# Patient Record
Sex: FEMALE | Race: BLACK OR AFRICAN AMERICAN | NOT HISPANIC OR LATINO | URBAN - METROPOLITAN AREA
[De-identification: names, ages, dates, MRNs, and addresses within clinical notes are randomized per-mention and may not be internally consistent; named-entity substitution may affect disease eponyms.]

---

## 2018-11-13 ENCOUNTER — OFFICE (OUTPATIENT)
Dept: URBAN - METROPOLITAN AREA CLINIC 12 | Facility: CLINIC | Age: 48
End: 2018-11-13

## 2018-11-13 VITALS
WEIGHT: 175 LBS | HEART RATE: 95 BPM | DIASTOLIC BLOOD PRESSURE: 75 MMHG | SYSTOLIC BLOOD PRESSURE: 111 MMHG | HEIGHT: 68 IN

## 2018-11-13 DIAGNOSIS — E66.3 OVERWEIGHT: ICD-10-CM

## 2018-11-13 DIAGNOSIS — K21.9 GASTRO-ESOPHAGEAL REFLUX DISEASE WITHOUT ESOPHAGITIS: ICD-10-CM

## 2018-11-13 DIAGNOSIS — Z80.0 FAMILY HISTORY OF MALIGNANT NEOPLASM OF DIGESTIVE ORGANS: ICD-10-CM

## 2018-11-13 DIAGNOSIS — Z86.010 PERSONAL HISTORY OF COLONIC POLYPS: ICD-10-CM

## 2018-11-13 PROCEDURE — 99203 OFFICE O/P NEW LOW 30 MIN: CPT | Performed by: INTERNAL MEDICINE

## 2018-11-13 RX ORDER — SODIUM PICOSULFATE, MAGNESIUM OXIDE, AND ANHYDROUS CITRIC ACID 10; 3.5; 12 MG/160ML; G/160ML; G/160ML
LIQUID ORAL
Qty: 1 | Refills: 0 | Status: ACTIVE
Start: 2018-11-13

## 2018-11-13 NOTE — SERVICENOTES
She appears to be overdue for colon polyp surveillance and is high risk for colon cancer screening so we will schedule colonoscopy accordingly as above.

## 2018-11-13 NOTE — SERVICEHPINOTES
Ms. Miner is a 47-year-old female here for evaluation for colonoscopy. The patient states that she does have a family history of colon cancer mother when she was in her 50s. She also states that she had a colonoscopy around [address] [facility] and was told that she had some small polyps and needed to return in one year for another colonoscopy but never returned. She states that she had EGD at the same time which was overall negative. At that time she was having some upper abdominal pain and ultimately was found to have gallstones and so underwent cholecystectomy in her symptoms resolved. She states overall she feels well. She does have some occasional mild reflux which is controlled with her diet though she does state certain foods will bring it on. She will take over-the-counter antacids as needed but does not take anything on a regular basis or any PPI. She denies any fevers, chills, nausea vomiting, abdominal pain, diarrhea, constipation, or rectal bleeding.